# Patient Record
Sex: MALE | HISPANIC OR LATINO
[De-identification: names, ages, dates, MRNs, and addresses within clinical notes are randomized per-mention and may not be internally consistent; named-entity substitution may affect disease eponyms.]

---

## 2023-06-17 ENCOUNTER — NON-APPOINTMENT (OUTPATIENT)
Age: 38
End: 2023-06-17

## 2023-06-26 PROBLEM — Z00.00 ENCOUNTER FOR PREVENTIVE HEALTH EXAMINATION: Status: ACTIVE | Noted: 2023-06-26

## 2023-06-27 ENCOUNTER — APPOINTMENT (OUTPATIENT)
Dept: SURGERY | Facility: CLINIC | Age: 38
End: 2023-06-27
Payer: COMMERCIAL

## 2023-06-27 VITALS
WEIGHT: 257 LBS | OXYGEN SATURATION: 96 % | SYSTOLIC BLOOD PRESSURE: 137 MMHG | DIASTOLIC BLOOD PRESSURE: 88 MMHG | HEART RATE: 96 BPM | TEMPERATURE: 98.2 F | HEIGHT: 71 IN | BODY MASS INDEX: 35.98 KG/M2

## 2023-06-27 PROCEDURE — 99203 OFFICE O/P NEW LOW 30 MIN: CPT

## 2023-06-27 NOTE — REVIEW OF SYSTEMS
[Negative] : Integumentary [FreeTextEntry2] : Weight loss as per HPI [FreeTextEntry7] : Abdominal swelling as per HPI

## 2023-06-27 NOTE — HISTORY OF PRESENT ILLNESS
[de-identified] : Patient is a 37 year old male with PMHx of HTN, who was recently seen at [facility] on 6/18/2023 after noticing "abdominal swelling" and was suggested to have a hernia and to see a surgeon for evaluation. Denies pain at the site of swelling which extends from subxiphoid region to supraumbilical region at midline. Denies N/V, no fever/chills, does endorse having recent weight loss of 10lbs attributed to stress from the passing of his father a few months ago. Otherwise denies tobacco/EtOH/drug use. NKDA.

## 2023-06-27 NOTE — PLAN
[FreeTextEntry1] : \par - I spoke with the patient regarding the findings, which are compatible with diastasis recti, with no palpable hernia defect on clinical exam\par - We discussed the fact that at this time, no surgical intervention was warranted, however the patient would benefit from follow up with his PCP\par - Patient was advised that should he have a finding of a discrete defect or bulging at a singular region consistent with hernia, to return for evaluation\par - Patient was agreeable with this plan and all his questions were answered to his apparent satisfaction

## 2023-06-27 NOTE — PHYSICAL EXAM
[Respiratory Effort] : normal respiratory effort [Normal Rate and Rhythm] : normal rate and rhythm [Alert] : alert [Oriented to Person] : oriented to person [Oriented to Place] : oriented to place [Oriented to Time] : oriented to time [Calm] : calm [de-identified] : NAD [de-identified] : Soft, non-distended, non-TTP, diastasis recti, no rebound/guarding [de-identified] : No skin changes to anterior abdominal wall